# Patient Record
Sex: FEMALE | Race: WHITE | Employment: OTHER | URBAN - METROPOLITAN AREA
[De-identification: names, ages, dates, MRNs, and addresses within clinical notes are randomized per-mention and may not be internally consistent; named-entity substitution may affect disease eponyms.]

---

## 2018-03-05 ENCOUNTER — HOSPITAL ENCOUNTER (OUTPATIENT)
Age: 78
Setting detail: SPECIMEN
Discharge: HOME | End: 2018-03-05
Payer: MEDICARE

## 2018-03-08 LAB — DERMATOLOGY PATHOLOGY REPORT: NORMAL

## 2019-01-21 ENCOUNTER — HOSPITAL ENCOUNTER (EMERGENCY)
Age: 79
Discharge: HOME | End: 2019-01-21
Attending: EMERGENCY MEDICINE
Payer: MEDICARE

## 2019-01-21 ENCOUNTER — APPOINTMENT (OUTPATIENT)
Dept: GENERAL RADIOLOGY | Age: 79
End: 2019-01-21
Payer: MEDICARE

## 2019-01-21 VITALS
HEIGHT: 62 IN | TEMPERATURE: 98 F | RESPIRATION RATE: 16 BRPM | BODY MASS INDEX: 30.62 KG/M2 | DIASTOLIC BLOOD PRESSURE: 69 MMHG | WEIGHT: 166.38 LBS | OXYGEN SATURATION: 99 % | SYSTOLIC BLOOD PRESSURE: 156 MMHG | HEART RATE: 96 BPM

## 2019-01-21 DIAGNOSIS — K59.00 CONSTIPATION, UNSPECIFIED CONSTIPATION TYPE: Primary | ICD-10-CM

## 2019-01-21 PROCEDURE — 74018 RADEX ABDOMEN 1 VIEW: CPT

## 2019-01-21 PROCEDURE — 99283 EMERGENCY DEPT VISIT LOW MDM: CPT

## 2019-01-21 RX ORDER — VALSARTAN 160 MG/1
TABLET ORAL DAILY
COMMUNITY

## 2019-01-21 RX ORDER — SODIUM PHOSPHATE, DIBASIC AND SODIUM PHOSPHATE, MONOBASIC 7; 19 G/133ML; G/133ML
118 ENEMA RECTAL ONCE
Status: DISCONTINUED | OUTPATIENT
Start: 2019-01-21 | End: 2019-01-21

## 2019-01-21 RX ORDER — RANITIDINE 150 MG/1
TABLET ORAL 2 TIMES DAILY
COMMUNITY

## 2019-01-21 RX ORDER — POLYETHYLENE GLYCOL 3350 17 G/17G
17 POWDER, FOR SOLUTION ORAL DAILY PRN
Qty: 510 G | Refills: 0 | Status: SHIPPED | OUTPATIENT
Start: 2019-01-21 | End: 2019-02-20

## 2019-01-21 RX ORDER — SIMVASTATIN 10 MG
TABLET ORAL NIGHTLY
COMMUNITY

## 2019-01-21 ASSESSMENT — PAIN SCALES - GENERAL: PAINLEVEL_OUTOF10: 9

## 2019-01-21 ASSESSMENT — PAIN DESCRIPTION - PAIN TYPE: TYPE: ACUTE PAIN

## 2019-01-21 ASSESSMENT — PAIN DESCRIPTION - LOCATION: LOCATION: ABDOMEN

## 2019-05-10 ENCOUNTER — APPOINTMENT (OUTPATIENT)
Dept: CT IMAGING | Age: 79
End: 2019-05-10
Payer: COMMERCIAL

## 2019-05-10 ENCOUNTER — HOSPITAL ENCOUNTER (EMERGENCY)
Age: 79
Discharge: HOME | End: 2019-05-10
Attending: EMERGENCY MEDICINE
Payer: COMMERCIAL

## 2019-05-10 VITALS
HEART RATE: 74 BPM | HEIGHT: 62 IN | DIASTOLIC BLOOD PRESSURE: 44 MMHG | BODY MASS INDEX: 27.79 KG/M2 | OXYGEN SATURATION: 100 % | WEIGHT: 151 LBS | SYSTOLIC BLOOD PRESSURE: 134 MMHG | RESPIRATION RATE: 18 BRPM | TEMPERATURE: 98.5 F

## 2019-05-10 DIAGNOSIS — E04.1 THYROID NODULE: ICD-10-CM

## 2019-05-10 DIAGNOSIS — S20.219A CONTUSION OF CHEST WALL, UNSPECIFIED LATERALITY, INITIAL ENCOUNTER: Primary | ICD-10-CM

## 2019-05-10 LAB
ALBUMIN SERPL-MCNC: 4.2 G/DL (ref 3.5–5.2)
ALBUMIN/GLOBULIN RATIO: ABNORMAL (ref 1–2.5)
ALP BLD-CCNC: 71 U/L (ref 35–104)
ALT SERPL-CCNC: 17 U/L (ref 5–33)
ANION GAP SERPL CALCULATED.3IONS-SCNC: 12 MMOL/L (ref 9–17)
AST SERPL-CCNC: 24 U/L
BILIRUB SERPL-MCNC: 0.41 MG/DL (ref 0.3–1.2)
BUN BLDV-MCNC: 19 MG/DL (ref 8–23)
BUN/CREAT BLD: 21 (ref 9–20)
CALCIUM SERPL-MCNC: 9.7 MG/DL (ref 8.6–10.4)
CHLORIDE BLD-SCNC: 100 MMOL/L (ref 98–107)
CO2: 27 MMOL/L (ref 20–31)
CREAT SERPL-MCNC: 0.91 MG/DL (ref 0.5–0.9)
GFR AFRICAN AMERICAN: >60 ML/MIN
GFR NON-AFRICAN AMERICAN: 60 ML/MIN
GFR SERPL CREATININE-BSD FRML MDRD: ABNORMAL ML/MIN/{1.73_M2}
GFR SERPL CREATININE-BSD FRML MDRD: ABNORMAL ML/MIN/{1.73_M2}
GLUCOSE BLD-MCNC: 127 MG/DL (ref 70–99)
HCT VFR BLD CALC: 40 % (ref 36.3–47.1)
HEMOGLOBIN: 12.8 G/DL (ref 11.9–15.1)
MCH RBC QN AUTO: 29.4 PG (ref 25.2–33.5)
MCHC RBC AUTO-ENTMCNC: 32 G/DL (ref 28.4–34.8)
MCV RBC AUTO: 91.7 FL (ref 82.6–102.9)
NRBC AUTOMATED: ABNORMAL PER 100 WBC
PDW BLD-RTO: 12.5 % (ref 11.8–14.4)
PLATELET # BLD: 214 K/UL (ref 138–453)
PMV BLD AUTO: 11.2 FL (ref 8.1–13.5)
POTASSIUM SERPL-SCNC: 3.6 MMOL/L (ref 3.7–5.3)
RBC # BLD: 4.36 M/UL (ref 3.95–5.11)
SODIUM BLD-SCNC: 139 MMOL/L (ref 135–144)
TOTAL PROTEIN: 7.7 G/DL (ref 6.4–8.3)
TROPONIN INTERP: NORMAL
TROPONIN T: NORMAL NG/ML
TROPONIN, HIGH SENSITIVITY: 12 NG/L (ref 0–14)
WBC # BLD: 11.5 K/UL (ref 3.5–11.3)

## 2019-05-10 PROCEDURE — 85027 COMPLETE CBC AUTOMATED: CPT

## 2019-05-10 PROCEDURE — 2580000003 HC RX 258: Performed by: EMERGENCY MEDICINE

## 2019-05-10 PROCEDURE — 96374 THER/PROPH/DIAG INJ IV PUSH: CPT

## 2019-05-10 PROCEDURE — 81003 URINALYSIS AUTO W/O SCOPE: CPT

## 2019-05-10 PROCEDURE — 6360000002 HC RX W HCPCS: Performed by: NURSE PRACTITIONER

## 2019-05-10 PROCEDURE — 2580000003 HC RX 258: Performed by: NURSE PRACTITIONER

## 2019-05-10 PROCEDURE — 6360000004 HC RX CONTRAST MEDICATION: Performed by: EMERGENCY MEDICINE

## 2019-05-10 PROCEDURE — 74177 CT ABD & PELVIS W/CONTRAST: CPT

## 2019-05-10 PROCEDURE — 84484 ASSAY OF TROPONIN QUANT: CPT

## 2019-05-10 PROCEDURE — 96375 TX/PRO/DX INJ NEW DRUG ADDON: CPT

## 2019-05-10 PROCEDURE — 71260 CT THORAX DX C+: CPT

## 2019-05-10 PROCEDURE — 80053 COMPREHEN METABOLIC PANEL: CPT

## 2019-05-10 PROCEDURE — 99285 EMERGENCY DEPT VISIT HI MDM: CPT

## 2019-05-10 PROCEDURE — 93005 ELECTROCARDIOGRAM TRACING: CPT

## 2019-05-10 RX ORDER — SODIUM CHLORIDE 0.9 % (FLUSH) 0.9 %
10 SYRINGE (ML) INJECTION PRN
Status: DISCONTINUED | OUTPATIENT
Start: 2019-05-10 | End: 2019-05-10 | Stop reason: HOSPADM

## 2019-05-10 RX ORDER — IBUPROFEN 600 MG/1
600 TABLET ORAL EVERY 6 HOURS PRN
Qty: 40 TABLET | Refills: 0 | Status: SHIPPED | OUTPATIENT
Start: 2019-05-10

## 2019-05-10 RX ORDER — 0.9 % SODIUM CHLORIDE 0.9 %
80 INTRAVENOUS SOLUTION INTRAVENOUS ONCE
Status: COMPLETED | OUTPATIENT
Start: 2019-05-10 | End: 2019-05-10

## 2019-05-10 RX ORDER — ACETAMINOPHEN AND CODEINE PHOSPHATE 300; 30 MG/1; MG/1
1 TABLET ORAL EVERY 4 HOURS PRN
Qty: 18 TABLET | Refills: 0 | Status: SHIPPED | OUTPATIENT
Start: 2019-05-10 | End: 2019-05-13

## 2019-05-10 RX ORDER — ONDANSETRON 2 MG/ML
4 INJECTION INTRAMUSCULAR; INTRAVENOUS ONCE
Status: COMPLETED | OUTPATIENT
Start: 2019-05-10 | End: 2019-05-10

## 2019-05-10 RX ORDER — MORPHINE SULFATE 2 MG/ML
2 INJECTION, SOLUTION INTRAMUSCULAR; INTRAVENOUS ONCE
Status: COMPLETED | OUTPATIENT
Start: 2019-05-10 | End: 2019-05-10

## 2019-05-10 RX ORDER — 0.9 % SODIUM CHLORIDE 0.9 %
250 INTRAVENOUS SOLUTION INTRAVENOUS ONCE
Status: COMPLETED | OUTPATIENT
Start: 2019-05-10 | End: 2019-05-10

## 2019-05-10 RX ADMIN — SODIUM CHLORIDE 250 ML: 0.9 INJECTION, SOLUTION INTRAVENOUS at 15:00

## 2019-05-10 RX ADMIN — IOPAMIDOL 75 ML: 755 INJECTION, SOLUTION INTRAVENOUS at 15:12

## 2019-05-10 RX ADMIN — ONDANSETRON 4 MG: 2 INJECTION INTRAMUSCULAR; INTRAVENOUS at 14:23

## 2019-05-10 RX ADMIN — SODIUM CHLORIDE 80 ML: 9 INJECTION, SOLUTION INTRAVENOUS at 15:12

## 2019-05-10 RX ADMIN — Medication 10 ML: at 15:12

## 2019-05-10 RX ADMIN — MORPHINE SULFATE 2 MG: 2 INJECTION, SOLUTION INTRAMUSCULAR; INTRAVENOUS at 14:23

## 2019-05-10 ASSESSMENT — PAIN DESCRIPTION - DESCRIPTORS: DESCRIPTORS: TENDER

## 2019-05-10 ASSESSMENT — PAIN SCALES - GENERAL
PAINLEVEL_OUTOF10: 6
PAINLEVEL_OUTOF10: 8

## 2019-05-10 ASSESSMENT — PAIN DESCRIPTION - ONSET: ONSET: SUDDEN

## 2019-05-10 ASSESSMENT — PAIN DESCRIPTION - ORIENTATION: ORIENTATION: ANTERIOR;LOWER

## 2019-05-10 ASSESSMENT — ENCOUNTER SYMPTOMS
SINUS PAIN: 0
BACK PAIN: 0
CHEST TIGHTNESS: 0
ABDOMINAL PAIN: 0
COUGH: 0
EYE PAIN: 0
VOMITING: 0
EYE REDNESS: 0
NAUSEA: 0
SHORTNESS OF BREATH: 0

## 2019-05-10 ASSESSMENT — PAIN DESCRIPTION - LOCATION: LOCATION: CHEST;RIB CAGE
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
## 2019-05-11 LAB
EKG ATRIAL RATE: 75 BPM
EKG P AXIS: 43 DEGREES
EKG P-R INTERVAL: 182 MS
EKG Q-T INTERVAL: 410 MS
EKG QRS DURATION: 84 MS
EKG QTC CALCULATION (BAZETT): 457 MS
EKG R AXIS: 8 DEGREES
EKG T AXIS: 20 DEGREES
EKG VENTRICULAR RATE: 75 BPM

## 2021-02-04 ENCOUNTER — HOSPITAL ENCOUNTER (OUTPATIENT)
Dept: PHYSICAL THERAPY | Facility: CLINIC | Age: 81
Setting detail: THERAPIES SERIES
Discharge: HOME | End: 2021-02-04
Payer: MEDICARE

## 2021-02-04 PROCEDURE — 97110 THERAPEUTIC EXERCISES: CPT

## 2021-02-04 PROCEDURE — 97161 PT EVAL LOW COMPLEX 20 MIN: CPT

## 2021-02-04 NOTE — CONSULTS
[] Pacemaker  [] HTN   [] Cancer   [x] Arthritis:   [] Surgeries: none to cervical or lumbar spines thus far. [x] Other:    Test: [] X-Ray  [x] MRI- nothing significant. Allergies: [x] NKA  [] Refer to intake sheet  Medication: [x] Refer to intake sheet  [] None  Denies utilizing Meclizine or anti-nausea medication. Function:     Hand dominance [x] (R)  [] (L)  Working:     [] Normal duty  [] Light duty  [] Off D/T Condition   [x] Retired  [] Not employed [] Disability   [] Other   Return to work:     Pain:    /10  [x] No c/o pain    Pain altered Tx: [x] No  [] Yes Action:  Symptoms: [] Improving  [] Worsening  [x] Same- \"sporadic. \"   Sleep: [x] OK  [] Disturbed       Function:  Hand Dominance  [x] Right  [] Left  Patient lives with: -   In what type of home [x]  One story   [] Two story   [] Split level   Number of stairs to enter -   Employer -   Job Status []  Normal duty   [] Light duty   [] Off due to condition    [x]  Retired   [] Not employed   [] Disability  [] Other:  []  Return to work:    Work activities/duties -     Gait Prior level of function Current level of function    [x] Independent  [] Assist [x] Independent  [] Assist   Device: [] Independent [] Independent    [] Straight Cane [] Quad cane [] Straight Cane [] Quad cane    [] Standard walker [] Rolling walker   [] 4 wheeled walker [] Standard walker [] Rolling walker   [] 4 wheeled walker    [] Wheelchair [] Wheelchair     Subjective Measure Score Indications   DHI [Dizziness Handicap Inventory] X; 16/100 LOW IMPAIRMENT/DISABILITY   ABC [Activities Balance Confidence Scale] X LOW IMPAIRMENT/DISABILITY   PCSS [Post Concussion Symptom Survey Scale] - -   BIVSS [Brain Injury Vision Symptom Survey]  - -     OBSERVATION No Deficit Deficit Not Tested   Posture      Forward Head []  [x]  []    Rounded Shoulders [x] [] []   Kyphosis [x] []inc.  [] dec. []   Lordosis [x] []inc.  [] dec. []   Lateral Shift [x] [] (R) [] (L) [] Scoliosis [x] [] []     Denies CVA/TIA, neuropathy history within herself. Red flags & Special Tests:     Test Result   Vertebral Artery N   Alar Ligament N   Transverse Ligament N     Cervical spine AROM:    Motion Goniometric Measurement Pain/Symptoms   Flexion WNL NONE   Extension WNL NONE   R, L rotation 75% R; 90% L \"Fuzzy\" R   R, L SB 25% B NONE     Cervical spine strength: Motion MMT Grade   Flexion WNL   Extension WNL   R, L rotation WNL   R, L SB WNL     Oculomotor Tests- With Fixation ([facility]):    Dons glasses at all times. Test Result- Normal, Abnormal (R) (L) Upward Downward Description   Spontaneous Nystagmus N        Gaze Holding Nystagmus N        Smooth Pursuit N        Ocular ROM N        Saccades N        Convergence    N/A N/A -   VOR Slow N N/A N/A N/A N/A    VOR Cancellation N N/A N/A N/A N/A    Head Thrust N   N/A N/A    Static Visual Acuity N N/A N/A N/A N/A -   Dynamic Visual Acuity WNL    >3 Line difference    Muscle Guard N/A N/A N/A N/A -   Valsalva N          Oculomotor Tests- Without Fixation (Vision Blocked):    Test Result Description   Spontaneous Nystagmus N    Gaze Hold Nystagmus N    Valsalva N    Head-Shaking Nystagmus N      Positional Tests:    Test R Torsional Upbeat L Torsional Upbeat Up Beat Down Beat Horizontal Duration Symptoms   Andrew Hallpike- R - - - - - - -   Andrew Hallpike- L - - - - - - -   Roll Test - - - - - - -     Also performed roll test with pt physically rolling to either side. Pt negative for nystagmus and symptoms with BPPV screens of all canals. Positional Treatments: NOT APPROPRIATE FOR TODAY     BPPV Type Treatment Technique Trials Result Other:                                 Problems:                                                                                     [x]  1. Vertigo  []  2. Difficulty walking a straight course                                    []  3.   Positive Jesi Hogan []  4.  Static balance deficit: mCTSIB  []  5. Dynamic balance deficit: Higgins Balance Scale (BBS), Dynamic Gait Index (DGI), Functional Gait Assessment (FGA)  [x]  6. Increased Dizziness Handicap Inventory ([facility])   []  7. Increased Post Concussion Symptom Survey (PCSS)  []  8. Increased Brain Injury Vision Symptom Survey (BIVSS)      Short Term Goals: (    6         Treatments)  [x] 1. Decreased Vertigo  [] 2. Improved gait mechanics, trajectory  [] 3. Negative Andrew Hallpike  [] 4. Improved static balance  [] 5. Improved dynamic balance  [x] 6. Decreased Dizziness Handicap Inventory ([facility])- <16/100  [] 7. Decreased Post Concussion Symptom Survey (PCSS)  [] 8. Decreased Brain Injury Vision Symptom Survey (BIVSS)  [x] 9. Independent with Home Exercise Program (HEP)  [] 10. Demonstrate knowledge of fall prevention   11. Will deny symptoms with rolling to the L in bed and laying supine in bed with turning head to the L in order to improve bed mobility and QOL. Patient Goals: none listed. Assessment: Patient is a 79 y.o. pleasant female who presents to physical therapy initial evaluation with signs and symptoms consistent with potential chronic history of positional vertigo- potential L posterior canal VS horizontal canal- now resolved. Patient demonstrates impairments and symptoms as detailed below in therapy goals. Patient intermittently limited in rolling to L in bed and laying supine in bed with turning her head to the L secondary to these impairments and increased symptoms. Patient may benefit from continued physical therapy services in order to address these impairments and symptoms, and return to QOL, ADLs, work- if symptoms return or intensify. Anticipated frequency detailed above. Prognosis not limited d/t secondary factors- justifying a low complexity evaluation. If unable to achieve significant improvements within six visits, will consult referring physician and consider a follow-up visit with said physician. Patient verbalized understanding and agreement to all aforementioned statements.      Rehab Potential: [x] Good  [] Fair  [] Poor  Suggested Professional Referral:  [x] No  [] Yes  Barriers to Goal Achievement: [x] No  [] Yes        If yes:  Domestic Concerns: [x] No  [] Yes     If yes:    Treatment Plan:  [x] Therapeutic Exercise   80456  [] Iontophoresis: 4 mg/mL Dexamethasone Sodium Phosphate  mAmin  41721   [x] Therapeutic Activity  49373 [] Vasopneumatic cold with compression  99363    [] Gait Training   29093 [] Ultrasound   04916   [] Neuromuscular Re-education  85617 [] Electrical Stimulation Unattended  17706   [] Manual Therapy  68798 [] Electrical Stimulation Attended  78774   [x] Instruction in HEP  [] Lumbar/Cervical Traction  52528   [] Aquatic Therapy   62922 [] Cold/hotpack    [] Massage   99262      [] Dry Needling, 1 or 2 muscles  21786   [] Biofeedback, first 15 minutes   23702  [] Biofeedback, additional 15 minutes   03476 [] Dry Needling, 3 or more muscles 65951     []  Medication allergies reviewed for use of    Dexamethasone Sodium Phosphate 4mg/ml     with iontophoresis treatments. Pt is not allergic. Other: X- CRT- [id number]. Frequency: 1 X/wk  x 6 visits [potential if returns]      [x] Plans/Goals, Risk/Benefits discussed with pt/family      Pt/Family Education: [x] Verbal  [x] Demo  [x] Written    Comprehension of Education:  [x] Verbalizes understanding. [] Demonstrates understanding. [x] Needs Review. [] Demonstrates/verbalizes understanding of HEP/Ed previously given. Todays Treatment:  Modalities:   Precautions: chronic history of positional vertigo- potential L posterior canal VS horizontal canal- now resolved   Exercises:  Exercise Reps/ Time Weight/ Level Comments   Education     Vestibular evaluation- objective tests and measures- rationale, expectations, results; chronic positional vertigo- potential L posterior canal VS horizontal canal- now resolved- issued informational handouts; not appropriate for treatment today; instructed to cease barker-daroff at home; chart will remain open for x1 month- if does not return in this time, chart will be discharged- verbalized understanding to all                                           Other:     Specific Instructions for next treatment: Re-assess for BPPV within all canals and treat if appropriate next visit. Discharge chart if does not return within x1 month.      Evaluation Complexity:  History (Personal factors, comorbidities) [] 0 [x] 1-2 [] 3+   Exam (limitations, restrictions) [x] 1-2 [] 3 [] 4+   Clinical presentation (progression) [x] Stable [] Evolving  [] Unstable   Decision Making [x] Low [] Moderate [] High     [x] Low Complexity [] Moderate Complexity [] High Complexity     Today's Treatment Charges        Mins       Units   [x] PT Evaluation- [x] Low; [] Moderate; [] High  47 1   [] Canalith repositioning maneuver/technique (CRM/T)     [x] Therapeutic Exercise 15min 10 1 TOTAL TREATMENT TIME: 62    Medicare- $106.04    Start Time: 1:08PM             Stop Time: 2:05PM     More objective information is available upon request.  Thank you for this referral.    Electronically signed by: Nora Lindo PT, DPT    Physician Signature:________________________________Date:__________________  By signing above or cosigning this note, I have reviewed this plan of care and certify a need for medically necessary rehabilitation services.       *PLEASE SIGN ABOVE AND FAX BACK ALL PAGES*

## 2021-02-04 NOTE — FLOWSHEET NOTE
John Fall Risk Assessment    Patient Name:  Bruna Steinberg  : 1940    Risk Factor Scale  Score   History of Falls [] Yes  [x] No 25  0 0   Secondary Diagnosis [] Yes  [x] No 15  0 0   Ambulatory Aid [] Furniture  [] Crutches/cane/walker  [x] None/bedrest/wheelchair/nurse 30  15  0 0   IV/Heparin Lock [] Yes  [x] No 20  0 0   Gait/Transferring [] Impaired  [x] Weak  [] Normal/bedrest/immobile 20  10  0 10   Mental Status [] Forgets limitations  [x] Oriented to own ability 15  0 0      Total: 10     Based on the Assessment score: check the appropriate box.     [x]  No intervention needed   Low =   Score of 0-24    []  Use standard prevention interventions Moderate =  Score of 24-44   [] Give patient handout and discuss fall prevention strategies   [] Establish goal of education for patient/family RE: fall prevention strategies    []  Use high risk prevention interventions High = Score of 45 and higher   [] Give patient handout and discuss fall prevention strategies   [] Establish goal of education for patient/family Re: fall prevention strategies   [] Discuss lifeline / other resources    Electronically signed by:   Mark Cuello PT  Date: 2021

## 2021-02-04 NOTE — CARE COORDINATION
Left message with answering service for Dr Bud Sierra office requesting a diagnosis code for vestibular rehab referral. Requested for physician's office to fax over an updated referral with dx code.   Electronically signed by Monserrat Holguin on 2/4/2021 at 1:05 PM

## 2021-02-05 NOTE — FLOWSHEET NOTE
John Fall Risk Assessment    Patient Name:  Nick Diego  : 1940    Risk Factor Scale  Score   History of Falls [] Yes  [x] No 25  0 0   Secondary Diagnosis [] Yes  [x] No 15  0 0   Ambulatory Aid [] Furniture  [] Crutches/cane/walker  [x] None/bedrest/wheelchair/nurse 30  15  0 0   IV/Heparin Lock [] Yes  [x] No 20  0 0   Gait/Transferring [] Impaired  [] Weak  [x] Normal/bedrest/immobile 20  10  0 0   Mental Status [] Forgets limitations  [x] Oriented to own ability 15  0 0      Total: 0     Based on the Assessment score: check the appropriate box.     [x]  No intervention needed   Low =   Score of 0-24    []  Use standard prevention interventions Moderate =  Score of 24-44   [] Give patient handout and discuss fall prevention strategies   [] Establish goal of education for patient/family RE: fall prevention strategies    []  Use high risk prevention interventions High = Score of 45 and higher   [] Give patient handout and discuss fall prevention strategies   [] Establish goal of education for patient/family Re: fall prevention strategies   [] Discuss lifeline / other resources    Electronically signed by:   Jose Elias Krueger PT  Date: 2021

## 2021-02-23 NOTE — DISCHARGE SUMMARY
[] [facility]) - [facility] &  Therapy  [address]  [phone]  F: [phone] [] [address]  [place] [address]   [address]  P: [phone]  F: [phone] [] [address] &  Therapy  [address]  P: [phone]  F: [phone] [x] [address]  P: [phone]  F: [phone] [] [address]  [facility]   [address]   [place]: [phone]  F: [phone]      Physical Therapy Discharge Note    Date: 2021      Patient: Cathi Florian  :   MRN: [id number]    Physician: Ad Lawrence MD              Insurance: Medicare   Medical Diagnosis: R42- dizziness              Rehab Codes: R42  Onset date: 2010; 2021- most recent episode                     Next Dr's appt. : x3 months with PCP  Date of initial visit: 2021                Date of final visit: 2021    Assessment:  Pt discharged today from PT services secondary to not returning- most likely due to no symptoms at initial evaluation. Unable to formally re-assess all therapy goals or outcomes measure. No further PT services recommended at this time. Problems:                                                                                     [x]? 1.  Vertigo  []? 2.  Difficulty walking a straight course                                    []?  3.  Positive Andrew Hallpike                                               []?  4.  Static balance deficit: mCTSIB  []? 5.  Dynamic balance deficit: Higgins Balance Scale (BBS), Dynamic Gait Index (DGI), Functional Gait Assessment (FGA)  [x]? 6. Increased Dizziness Handicap Inventory [facility])   []? 7. Increased Post Concussion Symptom Survey (PCSS)  []? 8.   Increased Brain Injury Vision Symptom Survey (BIVSS)       Short Term Goals: (    6         Treatments)  [x]? 1. Decreased Vertigo  []? 2. Improved gait mechanics, trajectory  []? 3. Negative Tabitha Axel  []? 4. Improved static balance  []? 5. Improved dynamic balance  [x]? 6. Decreased Dizziness Handicap Inventory ([facility])- <16/100  []? 7. Decreased Post Concussion Symptom Survey (PCSS)  []? 8. Decreased Brain Injury Vision Symptom Survey (BIVSS)  [x]? 9. Independent with Home Exercise Program (HEP)  []? 10. Demonstrate knowledge of fall prevention   11. Will deny symptoms with rolling to the L in bed and laying supine in bed with turning head to the L in order to improve bed mobility and QOL.     Patient Goals: none listed. Treatment to Date:  [x] Therapeutic Exercise    [] Modalities:  [] Therapeutic Activity    [] Ultrasound  [] Electrical Stimulation  [] Gait Training     [] Massage       [] Lumbar/Cervical Traction  [] Neuromuscular Re-education [] Cold/hotpack [] Iontophoresis: 4 mg/mL  [] Instruction in Home Exercise Program                     Dexamethasone Sodium  [] Manual Therapy             Phosphate 40-80 mAmin  [] Aquatic Therapy                   [] Vasocompression/    [x] Other: vestibular evaluation. Game Ready    Discharge Status:     [] Pt recovered from conditions. Treatment goals were met. [] Pt received maximum benefit. No further therapy indicated at this time. [] Pt to continue exercise/home instructions independently. [] Therapy interrupted due to:    [] Pt has 2 or more no shows/cancels, is discontinued per our policy. [] Pt has completed prescribed number of treatment sessions. [x] Other: see above. Electronically signed by Shasha Franks PT on 2/23/2021 at 11:43 AM    If you have any questions or concerns, please don't hesitate to call.   Thank you for your referral.

## 2021-05-18 ENCOUNTER — HOSPITAL ENCOUNTER (EMERGENCY)
Facility: CLINIC | Age: 81
Discharge: HOME | End: 2021-05-18
Attending: EMERGENCY MEDICINE
Payer: MEDICARE

## 2021-05-18 VITALS
HEART RATE: 82 BPM | HEIGHT: 62 IN | RESPIRATION RATE: 16 BRPM | TEMPERATURE: 98.5 F | SYSTOLIC BLOOD PRESSURE: 149 MMHG | WEIGHT: 150 LBS | OXYGEN SATURATION: 98 % | DIASTOLIC BLOOD PRESSURE: 69 MMHG | BODY MASS INDEX: 27.6 KG/M2

## 2021-05-18 DIAGNOSIS — S61.215A LACERATION OF LEFT RING FINGER WITHOUT FOREIGN BODY WITHOUT DAMAGE TO NAIL, INITIAL ENCOUNTER: ICD-10-CM

## 2021-05-18 DIAGNOSIS — S61.213A LACERATION OF LEFT MIDDLE FINGER WITHOUT FOREIGN BODY WITHOUT DAMAGE TO NAIL, INITIAL ENCOUNTER: Primary | ICD-10-CM

## 2021-05-18 PROCEDURE — 2500000003 HC RX 250 WO HCPCS: Performed by: EMERGENCY MEDICINE

## 2021-05-18 PROCEDURE — 90471 IMMUNIZATION ADMIN: CPT | Performed by: EMERGENCY MEDICINE

## 2021-05-18 PROCEDURE — 6360000002 HC RX W HCPCS: Performed by: EMERGENCY MEDICINE

## 2021-05-18 PROCEDURE — 90715 TDAP VACCINE 7 YRS/> IM: CPT | Performed by: EMERGENCY MEDICINE

## 2021-05-18 PROCEDURE — 12001 RPR S/N/AX/GEN/TRNK 2.5CM/<: CPT

## 2021-05-18 PROCEDURE — 99284 EMERGENCY DEPT VISIT MOD MDM: CPT

## 2021-05-18 RX ORDER — LIDOCAINE HYDROCHLORIDE 10 MG/ML
20 INJECTION, SOLUTION INFILTRATION; PERINEURAL ONCE
Status: COMPLETED | OUTPATIENT
Start: 2021-05-18 | End: 2021-05-18

## 2021-05-18 RX ADMIN — TETANUS TOXOID, REDUCED DIPHTHERIA TOXOID AND ACELLULAR PERTUSSIS VACCINE, ADSORBED 0.5 ML: 5; 2.5; 8; 8; 2.5 SUSPENSION INTRAMUSCULAR at 19:33

## 2021-05-18 RX ADMIN — LIDOCAINE HYDROCHLORIDE 20 ML: 10 INJECTION, SOLUTION INFILTRATION; PERINEURAL at 19:35

## 2021-05-18 NOTE — ED NOTES
Dr. Vi Munguia at bedside to place 5 sutures to right middle finger.      Man Christina RN  05/18/21 [address], RN  05/18/21 1017

## 2021-05-19 NOTE — ED PROVIDER NOTES
eMERGENCY dEPARTMENT eNCOUnter      Pt Name: Eugene Jimenez  MRN: [id number]  [place] 1940  Date of evaluation: 5/18/2021      CHIEF COMPLAINT       Chief Complaint   Patient presents with    Laceration     Hand left. Middle finger. HISTORY OF PRESENT ILLNESS    Eugene Jimenez is a 79 y.o. female who presents laceration. Patient states about 130 today she was using hedge tremors and cut her left middle and ring finger to continue just a rapid try to get it to stop but she is here for evaluation uncertain when her last tetanus was she denies any numbness tingling or weakness        REVIEW OF SYSTEMS       Positive for laceration negative for numbness tingling or weakness    PAST MEDICAL HISTORY    has a past medical history of Hyperlipidemia and Hypertension. SURGICAL HISTORY      has a past surgical history that includes Hysterectomy. CURRENT MEDICATIONS       Discharge Medication List as of 5/18/2021  7:48 PM      CONTINUE these medications which have NOT CHANGED    Details   ibuprofen (ADVIL;MOTRIN) 600 MG tablet Take 1 tablet by mouth every 6 hours as needed for Pain, Disp-40 tablet, R-0Print      valsartan (DIOVAN) 160 MG tablet Take by mouth dailyHistorical Med      simvastatin (ZOCOR) 10 MG tablet Take by mouth nightlyHistorical Med      ranitidine (ZANTAC) 150 MG tablet Take by mouth 2 times dailyHistorical Med             ALLERGIES     has No Known Allergies. FAMILY HISTORY     has no family status information on file. family history is not on file. SOCIAL HISTORY      reports that she has quit smoking. She has never used smokeless tobacco. She reports current alcohol use. She reports that she does not use drugs. PHYSICAL EXAM     INITIAL VITALS:  height is 5' 2\" (1.575 m) and weight is 68 kg (150 lb). Her oral temperature is 98.5 °F (36.9 °C). Her blood pressure is 149/69 (abnormal) and her pulse is 82. Her respiration is 16 and oxygen saturation is 98%.       General: foreign bodies closed using tissue adhesive    FINAL IMPRESSION      1. Laceration of left middle finger without foreign body without damage to nail, initial encounter    2. Laceration of left ring finger without foreign body without damage to nail, initial encounter          DISPOSITION/PLAN   DISPOSITION Decision To Discharge 05/18/2021 07:47:33 PM      Condition on Disposition    Stable    PATIENT REFERRED TO:  Meagan Hesterindigo, [address] [facility] [address]  [phone]      Stitches out 10 days      DISCHARGE MEDICATIONS:  Discharge Medication List as of 5/18/2021  7:48 PM          (Please note that portions of this note were completed with a voice recognition program.  Efforts were made to edit the dictations but occasionally words are mis-transcribed.)    Charlene Aguilera MD,, MD, F.A.C.E.P.   Attending Emergency Physician        Charlene Aguilera MD  05/18/21 3976

## 2021-05-28 ENCOUNTER — HOSPITAL ENCOUNTER (EMERGENCY)
Facility: CLINIC | Age: 81
Discharge: HOME | End: 2021-05-28
Attending: EMERGENCY MEDICINE
Payer: MEDICARE

## 2021-05-28 VITALS
HEART RATE: 70 BPM | TEMPERATURE: 97.5 F | SYSTOLIC BLOOD PRESSURE: 139 MMHG | HEIGHT: 62 IN | DIASTOLIC BLOOD PRESSURE: 64 MMHG | BODY MASS INDEX: 27.6 KG/M2 | WEIGHT: 150 LBS | RESPIRATION RATE: 18 BRPM | OXYGEN SATURATION: 96 %

## 2021-05-28 DIAGNOSIS — Z48.02 VISIT FOR SUTURE REMOVAL: Primary | ICD-10-CM

## 2021-05-28 PROCEDURE — 99284 EMERGENCY DEPT VISIT MOD MDM: CPT

## 2021-05-28 NOTE — ED PROVIDER NOTES
[facility] ED  [address] [facility]  Phone: [phone]      Pt Name: La Ritchie  MNW:[id number]  [place] 1940  Date of evaluation: 5/28/2021      CHIEF COMPLAINT       Chief Complaint   Patient presents with    Suture / Staple Removal     happened 10 days ago, left middle finger       HISTORY OF PRESENT ILLNESS   71-year-old female presents to the emergency department today for suture removal.  10 days ago she accidentally cut herself using trimming kylah. She lacerated the tip of her left third digit. Sutures have been in place since then. They have been healing nicely and she does not have any concerns. REVIEWOF SYSTEMS     Review of Systems   All other systems reviewed and are negative. PAST MEDICAL HISTORY    has a past medical history of Hyperlipidemia and Hypertension. SURGICAL HISTORY      has a past surgical history that includes Hysterectomy. [address]       Current Discharge Medication List      CONTINUE these medications which have NOT CHANGED    Details   ibuprofen (ADVIL;MOTRIN) 600 MG tablet Take 1 tablet by mouth every 6 hours as needed for Pain  Qty: 40 tablet, Refills: 0      valsartan (DIOVAN) 160 MG tablet Take by mouth daily      simvastatin (ZOCOR) 10 MG tablet Take by mouth nightly      ranitidine (ZANTAC) 150 MG tablet Take by mouth 2 times daily             ALLERGIES     has No Known Allergies. FAMILY HISTORY     has no family status information on file. family history is not on file. SOCIAL HISTORY      reports that she has quit smoking. She has never used smokeless tobacco. She reports current alcohol use. She reports that she does not use drugs. PHYSICAL EXAM     INITIAL VITALS:  height is 5' 2\" (1.575 m) and weight is 68 kg (150 lb). Her oral temperature is 97.5 °F (36.4 °C). Her blood pressure is 139/64 and her pulse is 70. Her respiration is 18 and oxygen saturation is 96%. Physical Exam  Vitals reviewed. Constitutional:       General: She is not in acute distress. Appearance: She is well-developed. HENT:      Head: Normocephalic and atraumatic. Eyes:      Conjunctiva/sclera: Conjunctivae normal.      Pupils: Pupils are equal, round, and reactive to light. Neck:      Trachea: No tracheal deviation. Pulmonary:      Effort: No respiratory distress. Abdominal:      Palpations: Abdomen is soft. Musculoskeletal:         General: No tenderness. Normal range of motion. Skin:     General: Skin is warm and dry. Comments: Sutures are in place in this patient's left third digit. No signs of infection. Wound is healing nicely. Neurological:      Mental Status: She is alert and oriented to person, place, and time. Psychiatric:         Behavior: Behavior normal.         Thought Content: Thought content normal.         Judgment: Judgment normal.           MDM:   Sutures have been removed by me. Patient tolerated procedure well. She does not have any further questions concerns or complaints. The patient was given the following medications:  No orders of the defined types were placed in this encounter. FINAL IMPRESSION      1.  Visit for suture removal          DISPOSITION/PLAN   DISPOSITION Decision To Discharge 05/28/2021 09:13:54 AM      Condition on Disposition  Good    PATIENT REFERRED TO:  Chastityeleonora Bonds, [address] [facility] [address] [phone]    Schedule an appointment as soon as possible for a visit in 3 days  For wound re-check      DISCHARGE MEDICATIONS:  Current Discharge Medication List          (Please note that portions of this note werecompleted with a voice recognition program.  Efforts were made to edit the dictations but occasionally words are mis-transcribed.)    Lola Hashimoto, MD MD, F.A.C.E.P, F.A.A.E.M  Emergency Physician Attending          Lola Hashimoto, MD  05/28/21 3635

## 2022-08-10 ENCOUNTER — APPOINTMENT (OUTPATIENT)
Dept: GENERAL RADIOLOGY | Facility: CLINIC | Age: 82
End: 2022-08-10
Payer: MEDICARE

## 2022-08-10 ENCOUNTER — HOSPITAL ENCOUNTER (EMERGENCY)
Facility: CLINIC | Age: 82
Discharge: HOME | End: 2022-08-10
Attending: EMERGENCY MEDICINE
Payer: MEDICARE

## 2022-08-10 VITALS
HEIGHT: 62 IN | TEMPERATURE: 98.6 F | RESPIRATION RATE: 16 BRPM | OXYGEN SATURATION: 99 % | DIASTOLIC BLOOD PRESSURE: 60 MMHG | HEART RATE: 82 BPM | BODY MASS INDEX: 26.87 KG/M2 | WEIGHT: 146 LBS | SYSTOLIC BLOOD PRESSURE: 139 MMHG

## 2022-08-10 DIAGNOSIS — L03.031 CELLULITIS OF TOE OF RIGHT FOOT: Primary | ICD-10-CM

## 2022-08-10 PROCEDURE — 99283 EMERGENCY DEPT VISIT LOW MDM: CPT

## 2022-08-10 PROCEDURE — 73630 X-RAY EXAM OF FOOT: CPT

## 2022-08-10 RX ORDER — CEPHALEXIN 500 MG/1
500 CAPSULE ORAL 4 TIMES DAILY
Qty: 28 CAPSULE | Refills: 0 | Status: SHIPPED | OUTPATIENT
Start: 2022-08-10 | End: 2022-08-10

## 2022-08-10 RX ORDER — CEPHALEXIN 500 MG/1
500 CAPSULE ORAL 4 TIMES DAILY
Qty: 28 CAPSULE | Refills: 0 | Status: SHIPPED | OUTPATIENT
Start: 2022-08-10 | End: 2022-08-17

## 2022-08-10 ASSESSMENT — ENCOUNTER SYMPTOMS
GASTROINTESTINAL NEGATIVE: 1
EYES NEGATIVE: 1
RESPIRATORY NEGATIVE: 1

## 2022-08-10 ASSESSMENT — PAIN SCALES - GENERAL: PAINLEVEL_OUTOF10: 1

## 2022-08-10 ASSESSMENT — PAIN - FUNCTIONAL ASSESSMENT: PAIN_FUNCTIONAL_ASSESSMENT: 0-10

## 2022-08-10 ASSESSMENT — LIFESTYLE VARIABLES
HOW MANY STANDARD DRINKS CONTAINING ALCOHOL DO YOU HAVE ON A TYPICAL DAY: 1 OR 2
HOW OFTEN DO YOU HAVE A DRINK CONTAINING ALCOHOL: MONTHLY OR LESS

## 2022-08-10 NOTE — ED PROVIDER NOTES
[address] [facility] ED  eMERGENCY dEPARTMENT eNCOUnter   Independent Attestation     Pt Name: Donald Estrella  MRN: [id number]  [place] 1940  Date of evaluation: 8/10/22       Donald Estrella is a [de-identified] y.o. female who presents with Toe Pain        Based on the medical record, the care appears appropriate. I was personally available for consultation in the Emergency Department.     Mirna Barrera DO  Attending Emergency  Physician               Mirna Barrera DO  08/10/22 1212

## 2022-08-10 NOTE — ED PROVIDER NOTES
[facility]  [address] [facility]  Phone: [phone]        Pt Name: Maddy Hawk  MRN: [id number]  [place] 1940  Date of evaluation: 8/10/22    [address] [place] [address]       Chief Complaint   Patient presents with    Toe Pain       HISTORY OF PRESENT ILLNESS (Location/Symptom, Timing/Onset, Context/Setting, Quality, Duration, Modifying Factors, Severity)      Maddy Hawk is a [de-identified] y.o. female with no pertinent PMH who presents to the ED via private auto with reports of right little toe pain which started yesterday. Patient states that she noticed yesterday that her little toe was starting to give her a little bit of pain, she noticed a little bit of swelling and redness as well. She states she cannot remember injuring her toe, but she may have stubbed it. She denies any new shoes. Denies any history of diabetes. Denies stepping on anything that she knows of. Denies any fever chills or body aches. Denies any nausea vomiting or diarrhea. Denies any change in her urinary or bowel habits. Denies any change in her dietary habits. Denies any chest pain or shortness of breath. She states she does have a history of cellulitis on the face, which was about 20 years ago. She rates her pain as a 1 out of 10 and denies need for any pain medication at this time. She states she did take a dose of Tylenol last night which did improve her symptoms a bit. PAST MEDICAL / SURGICAL / SOCIAL / FAMILY HISTORY     PMH:  has a past medical history of Hyperlipidemia and Hypertension. Surgical History:  has a past surgical history that includes Hysterectomy. Social History:  reports that she has quit smoking. She has never used smokeless tobacco. She reports current alcohol use. She reports that she does not use drugs. Family History: has no family status information on file. family history is not on file.   Psychiatric History: None    Allergies: Percocet [oxycodone-acetaminophen]    Home Medications:   Prior to Admission medications    Medication Sig Start Date End Date Taking? Authorizing Provider   cephALEXin (KEFLEX) 500 MG capsule Take 1 capsule by mouth in the morning and 1 capsule at noon and 1 capsule in the evening and 1 capsule before bedtime. Do all this for 7 days. 8/10/22 8/17/22 Yes MARLENY Cadena - NP   ibuprofen (ADVIL;MOTRIN) 600 MG tablet Take 1 tablet by mouth every 6 hours as needed for Pain 5/10/19   MARLENY Matias - CNP   valsartan (DIOVAN) 160 MG tablet Take by mouth daily    Historical Provider, MD   simvastatin (ZOCOR) 10 MG tablet Take by mouth nightly    Historical Provider, MD   raNITIdine (ZANTAC) 150 MG tablet Take by mouth 2 times daily  Patient not taking: Reported on 8/10/2022    Historical Provider, MD       REVIEW OF SYSTEMS  (2-9 systems for level 4, 10 ormore for level 5)      Review of Systems   Constitutional: Negative. HENT: Negative. Eyes: Negative. Respiratory: Negative. Cardiovascular: Negative. Gastrointestinal: Negative. Endocrine: Negative. Genitourinary: Negative. Musculoskeletal:         Right little toe pain and swelling   Skin:         Right little toe redness   Neurological: Negative. Hematological: Negative. Psychiatric/Behavioral: Negative. All other systems negative except as marked. PHYSICAL EXAM  (up to 7 for level 4, 8 or more for level 5)      INITIAL VITALS:  height is 5' 2\" (1.575 m) and weight is 66.2 kg (146 lb). Her temperature is 98.6 °F (37 °C). Her blood pressure is 139/60 and her pulse is 82. Her respiration is 16 and oxygen saturation is 99%. Vital signs reviewed. Physical Exam  Constitutional:       Appearance: Normal appearance. HENT:      Head: Normocephalic.       Right Ear: Tympanic membrane, ear canal and external ear normal.      Left Ear: Tympanic membrane, ear canal and external ear normal.      Nose: Nose normal.      Mouth/Throat:      Mouth: Mucous membranes are moist.      Pharynx: Oropharynx is clear. Eyes:      Extraocular Movements: Extraocular movements intact. Conjunctiva/sclera: Conjunctivae normal.      Pupils: Pupils are equal, round, and reactive to light. Cardiovascular:      Rate and Rhythm: Normal rate and regular rhythm. Pulses: Normal pulses. Heart sounds: Normal heart sounds. Pulmonary:      Effort: Pulmonary effort is normal.      Breath sounds: Normal breath sounds. Abdominal:      General: Bowel sounds are normal. There is no distension. Palpations: Abdomen is soft. Tenderness: There is no abdominal tenderness. There is no guarding. Musculoskeletal:         General: Swelling and tenderness present. No deformity or signs of injury. Normal range of motion. Cervical back: Normal range of motion. Comments: Swelling, tenderness and erythema noted to the right fifth digit; the fifth digit is warm to the touch; unable to assess capillary refill as patient's nails are painted; pedal pulses are felt and strong bilaterally; patient does have full range of motion of all of her digits   Skin:     General: Skin is warm and dry. Capillary Refill: Capillary refill takes less than 2 seconds. Findings: Erythema present. No bruising. Neurological:      Mental Status: She is alert and oriented to person, place, and time. Psychiatric:         Mood and Affect: Mood normal.         Behavior: Behavior normal.         Thought Content: Thought content normal.         Judgment: Judgment normal.         DIFFERENTIAL DIAGNOSIS / MDM     Upon exam, patient is resting in her room. She appears nontoxic no distress is noted. Heart sounds are normal limits upon auscultation. Lung sounds are clear and equal bilaterally. Bowel sounds are present in all 4 quadrants auscultation. No abdominal distention tenderness or guarding is noted. Upper extremity strength equal bilaterally. Lower extremity strength equal bilaterally. Patient's gait is steady. No facial asymmetry is noted. Patient is currently afebrile nontachycardic. Upon examination of the lower extremities, patient is wearing sandals, she states these are not new and denies any new shoes. There is some redness, swelling and tenderness noted to the right fifth digit. No nail involvement. The right fifth digit is warm to the touch. No puncture sites, lacerations or abrasions noted; no signs of any insect stings or bites. Patient denies stepping on anything or being bit or stung by any insect that she knows of. She states that she has not noticed any ticks in her yard around her home. No bull's-eye appearance to the toe or surrounding skin. Unable to assess capillary refill as patient's nails are painted. Pedal pulses are felt and strong bilaterally. Patient does have full range of motion of all of her digits. Concerns for possible cellulitis. There is no deformity noted. There is no bruising noted. Patient denies any known injury. She denies a history of diabetes. I will obtain x-ray of the right foot. She agrees this plan of care. Denies the need for any pain medication at this time. X-ray showing no acute bony abnormality involving the right foot per radiologist read. I will treat this as a cellulitis and place the patient on antibiotic for 7 days. She is encouraged to follow-up with her primary care physician within the next couple of days. Please return to the emergency department with any fever, chills, body aches, nausea, vomiting, increase in swelling/redness, fatigue, headache, dizziness, lightheadedness, chest pain, shortness of breath or any other new concerning or worsening symptoms. Patient states understanding of education is stable for outpatient follow-up.     PLAN (LABS / IMAGING / EKG):  Orders Placed This Encounter   Procedures    XR FOOT RIGHT (MIN 3 VIEWS)       MEDICATIONS ORDERED:  Orders Placed This Encounter   Medications cephALEXin (KEFLEX) 500 MG capsule     Sig: Take 1 capsule by mouth in the morning and 1 capsule at noon and 1 capsule in the evening and 1 capsule before bedtime. Do all this for 7 days. Dispense:  28 capsule     Refill:  0         Controlled Substances Monitoring:     DIAGNOSTIC RESULTS     EKG: All EKG's are interpreted by the Emergency Department Physician who either signs or Co-signs this chart in the absenceof a cardiologist.      RADIOLOGY: All images are read by the radiologist and their interpretations are reviewed. XR FOOT RIGHT (MIN 3 VIEWS)   Final Result   1. No acute abnormality involving the right foot. No results found. LABS:  No results found for this visit on 08/10/22. EMERGENCY DEPARTMENT COURSE           Vitals:    Vitals:    08/10/22 1212   BP: 139/60   Pulse: 82   Resp: 16   Temp: 98.6 °F (37 °C)   SpO2: 99%   Weight: 66.2 kg (146 lb)   Height: 5' 2\" (1.575 m)     -------------------------  BP: 139/60, Temp: 98.6 °F (37 °C), Heart Rate: 82, Resp: 16      RE-EVALUATION:  See ED Course notes above. CONSULTS:  None    PROCEDURES:  None    FINAL IMPRESSION      1. Cellulitis of toe of right foot          DISPOSITION / PLAN     CONDITION ON DISPOSITION:   Good / Stable for discharge. PATIENT REFERRED TO:  Randolph Stevens[address] [address] [phone]    Call in 1 day      [facility]  [address] [place] [address],[facility] [address]  [phone]  Go to   If symptoms worsen    DISCHARGE MEDICATIONS:  New Prescriptions    CEPHALEXIN (KEFLEX) 500 MG CAPSULE    Take 1 capsule by mouth in the morning and 1 capsule at noon and 1 capsule in the evening and 1 capsule before bedtime. Do all this for 7 days.        MARLENY Thorpe - NP   Emergency Medicine Nurse Practitioner    (Please note that portions of this note were completed with a voice recognition program.  Efforts were made to edit the dictations but occasionally words aremis-transcribed.)      Kermit Hanley, APRN - NP  08/10/22 [facility] [address]-75, APRN - NP  08/10/22 8552

## 2023-04-20 ENCOUNTER — HOSPITAL ENCOUNTER (EMERGENCY)
Facility: CLINIC | Age: 83
Discharge: HOME | End: 2023-04-20
Attending: EMERGENCY MEDICINE
Payer: MEDICARE

## 2023-04-20 ENCOUNTER — APPOINTMENT (OUTPATIENT)
Dept: CT IMAGING | Facility: CLINIC | Age: 83
End: 2023-04-20
Payer: MEDICARE

## 2023-04-20 VITALS
SYSTOLIC BLOOD PRESSURE: 153 MMHG | TEMPERATURE: 98.4 F | BODY MASS INDEX: 27.07 KG/M2 | DIASTOLIC BLOOD PRESSURE: 77 MMHG | HEART RATE: 82 BPM | RESPIRATION RATE: 19 BRPM | OXYGEN SATURATION: 99 % | WEIGHT: 148 LBS

## 2023-04-20 DIAGNOSIS — M54.50 ACUTE RIGHT-SIDED LOW BACK PAIN WITHOUT SCIATICA: Primary | ICD-10-CM

## 2023-04-20 DIAGNOSIS — N39.0 URINARY TRACT INFECTION WITHOUT HEMATURIA, SITE UNSPECIFIED: ICD-10-CM

## 2023-04-20 LAB
BACTERIA: ABNORMAL
BILIRUBIN URINE: NEGATIVE
COLOR: YELLOW
EPITHELIAL CELLS UA: ABNORMAL /HPF (ref 0–5)
GLUCOSE UR STRIP.AUTO-MCNC: NEGATIVE MG/DL
KETONES UR STRIP.AUTO-MCNC: NEGATIVE MG/DL
LEUKOCYTE ESTERASE UR QL STRIP.AUTO: ABNORMAL
NITRITE UR QL STRIP.AUTO: NEGATIVE
OTHER OBSERVATIONS UA: ABNORMAL
PROT UR STRIP.AUTO-MCNC: 5 MG/DL (ref 5–8)
PROT UR STRIP.AUTO-MCNC: NEGATIVE MG/DL
RBC CLUMPS #/AREA URNS AUTO: ABNORMAL /HPF (ref 0–2)
SPECIFIC GRAVITY UA: 1.02 (ref 1–1.03)
TURBIDITY: CLEAR
URINE HGB: NEGATIVE
UROBILINOGEN, URINE: NORMAL
WBC UA: ABNORMAL /HPF (ref 0–5)

## 2023-04-20 PROCEDURE — 96372 THER/PROPH/DIAG INJ SC/IM: CPT

## 2023-04-20 PROCEDURE — 6360000002 HC RX W HCPCS: Performed by: EMERGENCY MEDICINE

## 2023-04-20 PROCEDURE — 74176 CT ABD & PELVIS W/O CONTRAST: CPT

## 2023-04-20 PROCEDURE — 81001 URINALYSIS AUTO W/SCOPE: CPT

## 2023-04-20 PROCEDURE — 99284 EMERGENCY DEPT VISIT MOD MDM: CPT

## 2023-04-20 RX ORDER — KETOROLAC TROMETHAMINE 30 MG/ML
30 INJECTION, SOLUTION INTRAMUSCULAR; INTRAVENOUS ONCE
Status: COMPLETED | OUTPATIENT
Start: 2023-04-20 | End: 2023-04-20

## 2023-04-20 RX ORDER — NITROFURANTOIN 25; 75 MG/1; MG/1
100 CAPSULE ORAL 2 TIMES DAILY
Qty: 14 CAPSULE | Refills: 0 | Status: SHIPPED | OUTPATIENT
Start: 2023-04-20 | End: 2023-04-27

## 2023-04-20 RX ADMIN — KETOROLAC TROMETHAMINE 30 MG: 30 INJECTION, SOLUTION INTRAMUSCULAR; INTRAVENOUS at 18:45

## 2023-04-20 ASSESSMENT — ENCOUNTER SYMPTOMS
BACK PAIN: 1
SORE THROAT: 0
SHORTNESS OF BREATH: 0
DIARRHEA: 0
VOMITING: 0

## 2023-04-20 ASSESSMENT — PAIN - FUNCTIONAL ASSESSMENT: PAIN_FUNCTIONAL_ASSESSMENT: 0-10

## 2023-04-20 ASSESSMENT — PAIN SCALES - GENERAL
PAINLEVEL_OUTOF10: 8
PAINLEVEL_OUTOF10: 8

## 2023-04-20 NOTE — ED PROVIDER NOTES
HISTORY       Past Surgical History:   Procedure Laterality Date    HYSTERECTOMY (CERVIX STATUS UNKNOWN)           CURRENT MEDICATIONS     Previous Medications    IBUPROFEN (ADVIL;MOTRIN) 600 MG TABLET    Take 1 tablet by mouth every 6 hours as needed for Pain    RANITIDINE (ZANTAC) 150 MG TABLET    Take by mouth 2 times daily    SIMVASTATIN (ZOCOR) 10 MG TABLET    Take by mouth nightly    VALSARTAN (DIOVAN) 160 MG TABLET    Take by mouth daily       ALLERGIES     Percocet [oxycodone-acetaminophen]    FAMILY HISTORY     No family history on file. SOCIAL HISTORY       Social History     Socioeconomic History    Marital status:    Tobacco Use    Smoking status: Former    Smokeless tobacco: Never   Vaping Use    Vaping Use: Never used   Substance and Sexual Activity    Alcohol use: Yes     Comment: social    Drug use: No    Sexual activity: Not Currently       SCREENINGS    Alfa Coma Scale  Eye Opening: Spontaneous  Best Verbal Response: Oriented  Best Motor Response: Obeys commands  [place] Coma Scale Score: 15        PHYSICAL EXAM    (up to 7 for level 4, 8 or more for level 5)     ED Triage Vitals [04/20/23 1711]   BP Temp Temp Source Heart Rate Resp SpO2 Height Weight   (!) 153/77 98.4 °F (36.9 °C) Oral 82 19 99 % -- 148 lb (67.1 kg)       Physical Exam  Vitals and nursing note reviewed. Constitutional:       General: She is not in acute distress. Appearance: Normal appearance. She is not ill-appearing or toxic-appearing. HENT:      Head: Normocephalic and atraumatic. Nose: Nose normal. No congestion. Mouth/Throat:      Mouth: Mucous membranes are moist.   Eyes:      General:         Right eye: No discharge. Left eye: No discharge. Conjunctiva/sclera: Conjunctivae normal.   Cardiovascular:      Rate and Rhythm: Normal rate and regular rhythm. Pulses: Normal pulses. Heart sounds: Normal heart sounds. No murmur heard.   Pulmonary:      Effort: Pulmonary effort

## 2024-05-12 ENCOUNTER — HOSPITAL ENCOUNTER (EMERGENCY)
Facility: CLINIC | Age: 84
Discharge: HOME | End: 2024-05-12
Attending: EMERGENCY MEDICINE
Payer: MEDICARE

## 2024-05-12 ENCOUNTER — APPOINTMENT (OUTPATIENT)
Dept: GENERAL RADIOLOGY | Facility: CLINIC | Age: 84
End: 2024-05-12
Payer: MEDICARE

## 2024-05-12 VITALS
DIASTOLIC BLOOD PRESSURE: 90 MMHG | RESPIRATION RATE: 16 BRPM | TEMPERATURE: 99.1 F | SYSTOLIC BLOOD PRESSURE: 174 MMHG | HEART RATE: 75 BPM | WEIGHT: 148 LBS | HEIGHT: 62 IN | OXYGEN SATURATION: 97 % | BODY MASS INDEX: 27.23 KG/M2

## 2024-05-12 DIAGNOSIS — M17.12 OSTEOARTHRITIS OF LEFT KNEE, UNSPECIFIED OSTEOARTHRITIS TYPE: Primary | ICD-10-CM

## 2024-05-12 PROCEDURE — 99283 EMERGENCY DEPT VISIT LOW MDM: CPT

## 2024-05-12 PROCEDURE — 73562 X-RAY EXAM OF KNEE 3: CPT

## 2024-05-12 RX ORDER — MELOXICAM 15 MG/1
15 TABLET ORAL DAILY
Qty: 14 TABLET | Refills: 0 | Status: SHIPPED | OUTPATIENT
Start: 2024-05-12

## 2024-05-12 ASSESSMENT — PAIN DESCRIPTION - ONSET: ONSET: ON-GOING

## 2024-05-12 ASSESSMENT — PAIN DESCRIPTION - ORIENTATION: ORIENTATION: LEFT

## 2024-05-12 ASSESSMENT — PAIN DESCRIPTION - PAIN TYPE: TYPE: ACUTE PAIN

## 2024-05-12 ASSESSMENT — PAIN DESCRIPTION - DESCRIPTORS: DESCRIPTORS: SHARP

## 2024-05-12 ASSESSMENT — PAIN DESCRIPTION - FREQUENCY: FREQUENCY: INTERMITTENT

## 2024-05-12 ASSESSMENT — PAIN SCALES - GENERAL: PAINLEVEL_OUTOF10: 8

## 2024-05-12 ASSESSMENT — PAIN DESCRIPTION - LOCATION: LOCATION: KNEE

## 2024-05-12 NOTE — DISCHARGE INSTR - COC
Continuity of Care Form    Patient Name: Ruma Carreon   :  1940  MRN:  [id number]    Admit date:  2024  Discharge date:  ***    Code Status Order: No Order   Advance Directives:     Admitting Physician:  No admitting provider for patient encounter.  PCP: Lino Swan MD    Discharging Nurse: ***  Discharging Hospital Unit/Room#: SRI/SRI  Discharging Unit Phone Number: ***    Emergency Contact:   Extended Emergency Contact Information  Primary Emergency Contact: Nayely Meyer  Home Phone: [phone]  Relation: Child    Past Surgical History:  Past Surgical History:   Procedure Laterality Date    HYSTERECTOMY (CERVIX STATUS UNKNOWN)         Immunization History:   Immunization History   Administered Date(s) Administered    COVID-19, MODERNA Bivalent, (age 12y+), IM, 50 mcg/0.5 mL 10/15/2022    COVID-19, MODERNA, (- formula), (age 12y+), IM, 50mcg/0.5mL 2024    TDaP, ADACEL (age 10y-64y), BOOSTRIX (age 10y+), IM, 0.5mL 2021       Active Problems:  There is no problem list on file for this patient.      Isolation/Infection:   Isolation            No Isolation          Patient Infection Status       None to display            Nurse Assessment:  Last Vital Signs: BP (!) 174/90   Pulse 75   Temp 99.1 °F (37.3 °C) (Oral)   Resp 16   Ht 1.575 m (5' 2\")   Wt 67.1 kg (148 lb)   SpO2 97%   BMI 27.07 kg/m²     Last documented pain score (0-10 scale): Pain Level: 8  Last Weight:   Wt Readings from Last 1 Encounters:   24 67.1 kg (148 lb)     Mental Status:  {IP PT MENTAL STATUS:}    IV Access:  {[facility] IV ACCESS:171566804}    Nursing Mobility/ADLs:  Walking   {CHP DME ADLs:235047942}  Transfer  {CHP DME ADLs:771694330}  Bathing  {CHP DME ADLs:655257127}  Dressing  {CHP DME ADLs:362453767}  Toileting  {CHP DME ADLs:021540651}  Feeding  {CHP DME ADLs:436420586}  Med Admin  {[facility] ADLs:872066217}  Med Delivery   {[facility] MED Delivery:647420491}    Wound Care Documentation

## 2024-05-12 NOTE — ED PROVIDER NOTES
[facility]  EMERGENCY DEPARTMENT ENCOUNTER      Pt Name: Ruma Carreon  MRN: [id number]  Birthdate 1940  Date of evaluation: 5/12/2024  Provider: MARLENY Arcos CNP    CHIEF COMPLAINT       Chief Complaint   Patient presents with    Knee Pain     Left         HISTORY OF PRESENT ILLNESS   (Location/Symptom, Timing/Onset, Context/Setting, Quality, Duration, Modifying Factors, Severity)  Note limiting factors.   Ruma Carreon is a 83 y.o. female who presents to the emergency department for evaluation of left knee pain.  Patient states she was working out in the yard the past couple of days trimming bushes when the left knee pain began.  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  She states now both of her knees are causing her pain.  She states she used to follow with Dr. Arenas orthopedics, but has not seen him in over 9 years.  She states she tried taking some acetaminophen yesterday to treat her discomfort without much relief.  She denies any excessive swelling rashes or sores to left knee.  She states she is able to ambulate, but it does cause her discomfort worse in the left knee than the right.        Nursing Notes were reviewed.    REVIEW OF SYSTEMS       Constitutional: No fevers or chills   HEENT: No sore throat, rhinorrhea, or earache   Eyes: No blurry vision or double vision no drainage   Cardiovascular: No chest pain or tachycardia   Respiratory: No wheezing or shortness of breath no cough   Gastrointestinal: No nausea, vomiting, diarrhea, constipation, or abdominal pain   : No hematuria or dysuria   Musculoskeletal: No swelling or pain + bilateral  knee pain worse on left than right     Skin: No rash   Neurological: No focal neurologic complaints, paresthesias, weakness, or headache      Except as noted above the remainder of the review of systems was reviewed and negative.       PAST MEDICAL 
function distally.  X-ray shows DJD.  She declines a brace.  She will be following up with her orthopedist.  She is discharged in good condition.     Amanda Warner MD  05/13/24 2031

## 2024-05-12 NOTE — DISCHARGE INSTRUCTIONS
Follow-up with orthopedic doctor for reevaluation and continued care.  Call first thing in the morning to schedule an appointment this week     Take Mobic daily as directed for discomfort    You may take acetaminophen as directed as needed for discomfort as well if needed in addition to the Mobic    Ice to left knee 3 times a day    Keep left leg elevated at all times when sitting    If any your symptoms worsen or any new or concerning symptoms arise please return to the emergency department immediately

## 2024-05-14 ENCOUNTER — OFFICE VISIT (OUTPATIENT)
Dept: ORTHOPEDIC SURGERY | Age: 84
End: 2024-05-14
Payer: MEDICARE

## 2024-05-14 VITALS — OXYGEN SATURATION: 99 % | WEIGHT: 153 LBS | HEIGHT: 60 IN | BODY MASS INDEX: 30.04 KG/M2 | RESPIRATION RATE: 17 BRPM

## 2024-05-14 DIAGNOSIS — M17.12 ARTHRITIS OF LEFT KNEE: ICD-10-CM

## 2024-05-14 DIAGNOSIS — M25.562 ACUTE PAIN OF LEFT KNEE: Primary | ICD-10-CM

## 2024-05-14 DIAGNOSIS — M25.562 LEFT KNEE PAIN, UNSPECIFIED CHRONICITY: Primary | ICD-10-CM

## 2024-05-14 PROCEDURE — 99203 OFFICE O/P NEW LOW 30 MIN: CPT

## 2024-05-14 PROCEDURE — 1036F TOBACCO NON-USER: CPT

## 2024-05-14 PROCEDURE — 1123F ACP DISCUSS/DSCN MKR DOCD: CPT

## 2024-05-14 PROCEDURE — G8419 CALC BMI OUT NRM PARAM NOF/U: HCPCS

## 2024-05-14 PROCEDURE — G8400 PT W/DXA NO RESULTS DOC: HCPCS

## 2024-05-14 PROCEDURE — 1090F PRES/ABSN URINE INCON ASSESS: CPT

## 2024-05-14 PROCEDURE — G8427 DOCREV CUR MEDS BY ELIG CLIN: HCPCS

## 2024-05-14 PROCEDURE — 20610 DRAIN/INJ JOINT/BURSA W/O US: CPT

## 2024-05-14 RX ORDER — METHYLPREDNISOLONE ACETATE 80 MG/ML
80 INJECTION, SUSPENSION INTRA-ARTICULAR; INTRALESIONAL; INTRAMUSCULAR; SOFT TISSUE ONCE
Status: COMPLETED | OUTPATIENT
Start: 2024-05-14 | End: 2024-05-15

## 2024-05-14 RX ORDER — BUPIVACAINE HYDROCHLORIDE 2.5 MG/ML
2 INJECTION, SOLUTION INFILTRATION; PERINEURAL ONCE
Status: COMPLETED | OUTPATIENT
Start: 2024-05-14 | End: 2024-05-15

## 2024-05-14 ASSESSMENT — ENCOUNTER SYMPTOMS
VOMITING: 0
NAUSEA: 0
COLOR CHANGE: 0

## 2024-05-14 NOTE — PROGRESS NOTES
[facility] PHYSICIANS [facility] ORTHOPEDICS AND SPORTS MEDICINE  [address]  [place] [address]  Dept: [phone]    Ambulatory Orthopedic New Patient Visit      CHIEF COMPLAINT:    Chief Complaint   Patient presents with    Knee Pain     Left knee pain       HISTORY OF PRESENT ILLNESS:      Date of Injury: no known injuries.     The patient is a 83 y.o. female who is being seen  for consultation and evaluation of her bilateral knee pain.  She has had the right knee pain for many years now, but states that she only recently started to develop the left knee pain over the past several days.  She was evaluated for the left knee pain in the emergency department 2 days ago on 5/12/2024 and at that time x-rays of the left knee were done.  The x-rays demonstrated mild degenerative changes throughout the left knee. She was prescribed a two week supply of Meloxicam 15mg and referred to our office.  She states that she took the meloxicam yesterday and that she did notice a good improvement in her pain.  She did not take any meloxicam today.  She locates the pain as being along the anterior and medial aspect of the knee and she also states that the pain occasionally radiates up her thigh and down her shin.  She denies any swelling, redness, or warmth of the knee.  She also denies any mechanical catching or locking of the knee.  She states that the pain is worse with walking and going down steps.  She also states that the pain is bothersome at night when she is trying to sleep.      Past Medical History:    Past Medical History:   Diagnosis Date    Hyperlipidemia     Hypertension        Past Surgical History:    Past Surgical History:   Procedure Laterality Date    HYSTERECTOMY (CERVIX STATUS UNKNOWN)         Current Medications:   Current Outpatient Medications   Medication Sig Dispense Refill    meloxicam (MOBIC) 15 MG tablet Take 1 tablet by mouth daily 14

## 2024-05-15 RX ADMIN — METHYLPREDNISOLONE ACETATE 80 MG: 80 INJECTION, SUSPENSION INTRA-ARTICULAR; INTRALESIONAL; INTRAMUSCULAR; SOFT TISSUE at 08:21

## 2024-05-15 RX ADMIN — BUPIVACAINE HYDROCHLORIDE 5 MG: 2.5 INJECTION, SOLUTION INFILTRATION; PERINEURAL at 08:20

## 2024-05-20 ENCOUNTER — TELEPHONE (OUTPATIENT)
Dept: ORTHOPEDIC SURGERY | Age: 84
End: 2024-05-20

## 2024-05-20 ENCOUNTER — OFFICE VISIT (OUTPATIENT)
Dept: ORTHOPEDIC SURGERY | Age: 84
End: 2024-05-20
Payer: MEDICARE

## 2024-05-20 VITALS — HEIGHT: 60 IN | RESPIRATION RATE: 16 BRPM | OXYGEN SATURATION: 98 % | WEIGHT: 152.4 LBS | BODY MASS INDEX: 29.92 KG/M2

## 2024-05-20 DIAGNOSIS — R21 RASH: ICD-10-CM

## 2024-05-20 DIAGNOSIS — M25.562 LEFT KNEE PAIN, UNSPECIFIED CHRONICITY: Primary | ICD-10-CM

## 2024-05-20 DIAGNOSIS — M17.12 ARTHRITIS OF LEFT KNEE: ICD-10-CM

## 2024-05-20 PROCEDURE — G8400 PT W/DXA NO RESULTS DOC: HCPCS

## 2024-05-20 PROCEDURE — 1123F ACP DISCUSS/DSCN MKR DOCD: CPT

## 2024-05-20 PROCEDURE — 1036F TOBACCO NON-USER: CPT

## 2024-05-20 PROCEDURE — G8427 DOCREV CUR MEDS BY ELIG CLIN: HCPCS

## 2024-05-20 PROCEDURE — G8419 CALC BMI OUT NRM PARAM NOF/U: HCPCS

## 2024-05-20 PROCEDURE — 1090F PRES/ABSN URINE INCON ASSESS: CPT

## 2024-05-20 PROCEDURE — 99212 OFFICE O/P EST SF 10 MIN: CPT

## 2024-05-20 RX ORDER — VALSARTAN AND HYDROCHLOROTHIAZIDE 160; 12.5 MG/1; MG/1
1 TABLET, FILM COATED ORAL DAILY
COMMUNITY

## 2024-05-20 RX ORDER — OMEPRAZOLE 20 MG/1
CAPSULE, DELAYED RELEASE ORAL
COMMUNITY
Start: 2024-05-07

## 2024-05-20 RX ORDER — FLUOCINONIDE TOPICAL SOLUTION USP, 0.05% 0.5 MG/ML
SOLUTION TOPICAL
COMMUNITY
Start: 2024-03-14

## 2024-05-20 ASSESSMENT — ENCOUNTER SYMPTOMS
COLOR CHANGE: 0
NAUSEA: 0
VOMITING: 0

## 2024-05-20 NOTE — PROGRESS NOTES
[facility] ORTHOPEDICS AND SPORTS MEDICINE  [address]  [place] [facility] [address]  Dept: [phone]  Dept Fax: [phone]        Ambulatory Follow Up      Subjective:   Ruma Carreon is a 83 y.o. year old female who presents to our office today for routine followup regarding her   1. Left knee pain, unspecified chronicity    2. Arthritis of left knee    .    Chief Complaint   Patient presents with    Knee Pain     Left Knee-li 5/14/24       Date of Injury: no known injuries.     HPI Ruma Carreon  is a 83 y.o. female who presents today for follow-up of her left knee pain.  She was last seen in the office 1 week ago on 5/14/2024.  At that time she was given a corticosteroid injection.  She states that she has noticed minimal relief with the corticosteroid injection.  She continues to have most of her pain at night when she is trying to sleep.  She is here today because she noticed a rash along the lower left leg.  She states that she did not notice this rash until after the injection.  She denies any itching, drainage, or swelling along the rash.  She also denies any redness, swelling, or warmth of the knee.  She additionally denies any fevers or chills.  She does state that she was diagnosed with a staph infection a couple of months ago and was prescribed topical mupirocin by her PCP.  She started applying this yesterday to the new rash.    Review of Systems   Constitutional:  Negative for chills and fever.   Gastrointestinal:  Negative for nausea and vomiting.   Musculoskeletal:  Positive for arthralgias. Negative for joint swelling.   Skin:  Positive for rash. Negative for color change.   Neurological:  Negative for weakness and numbness.         Objective :   Resp 16   Ht 1.524 m (5')   Wt 69.1 kg (152 lb 6.4 oz)   SpO2 98%   BMI 29.76 kg/m²  Body mass index is 29.76 kg/m².  General: Ruma Carreon is a 83 y.o. female who is

## 2024-05-20 NOTE — TELEPHONE ENCOUNTER
Pt calling in stating her is having left leg pain and would like to be sen today by kassidy if possible

## 2024-06-17 ENCOUNTER — OFFICE VISIT (OUTPATIENT)
Dept: ORTHOPEDIC SURGERY | Age: 84
End: 2024-06-17
Payer: MEDICARE

## 2024-06-17 VITALS — BODY MASS INDEX: 29.25 KG/M2 | HEIGHT: 60 IN | RESPIRATION RATE: 14 BRPM | WEIGHT: 149 LBS

## 2024-06-17 DIAGNOSIS — M17.12 ARTHRITIS OF LEFT KNEE: ICD-10-CM

## 2024-06-17 DIAGNOSIS — R21 RASH: Primary | ICD-10-CM

## 2024-06-17 PROCEDURE — G8419 CALC BMI OUT NRM PARAM NOF/U: HCPCS

## 2024-06-17 PROCEDURE — G8400 PT W/DXA NO RESULTS DOC: HCPCS

## 2024-06-17 PROCEDURE — G8427 DOCREV CUR MEDS BY ELIG CLIN: HCPCS

## 2024-06-17 PROCEDURE — 1036F TOBACCO NON-USER: CPT

## 2024-06-17 PROCEDURE — 1090F PRES/ABSN URINE INCON ASSESS: CPT

## 2024-06-17 PROCEDURE — 99213 OFFICE O/P EST LOW 20 MIN: CPT

## 2024-06-17 PROCEDURE — 1123F ACP DISCUSS/DSCN MKR DOCD: CPT

## 2024-06-17 ASSESSMENT — ENCOUNTER SYMPTOMS
NAUSEA: 0
COLOR CHANGE: 0
VOMITING: 0

## 2024-06-17 NOTE — PROGRESS NOTES
a walker.  Examination of the knee demonstrates that the skin is intact and there is no erythema, ecchymosis, or soft tissue swelling.  There is a healing shingles rash inferior and medial to the knee.  There is no pain to palpation along medial or lateral joint lines.  Range of motion of the knee includes 0-120.  The knee is stable to both varus and valgus stress with 0 and 30 degrees of flexion.  There is mildly increased pain with valgus Soren's.  Anterior drawer testing and Lachman's is negative.  The calf is soft and nontender.  Motor, sensory, and vascular function is grossly intact.  Neuro: alert and oriented to person and place.   Eyes: Extra-ocular muscles intact  Mouth: Oral mucosa moist. No perioral lesions  Pulm: Respirations unlabored and regular. Symmetric chest excursion without outward deformity is noted.   Skin: warm, well perfused  Psych:   Patient has good fund of knowledge and displays understanging of exam, diagnosis, and plan.    Radiology:     XR knee left (min 4 views) 5/15/24  Narrative & Impression  History:   Left knee pain     Findings:   Standing AP/Lateral/Tunnel/Merchant view xrays of the Left done in the office today shows moderate to severe medial joint space narrowing, tricompartmental osteophytosis, joint line sclerosis medially.    No evidence of fracture, subluxation, dislocation, radioopaque foreign body/tumor is noted.  Lateral subluxation of the tibia is appreciated.     Impression:   Left knee moderate to severe degenerative changes as described above.      Assessment:      1. Rash    2. Arthritis of left knee       Plan:   Assessment & Plan     The patient presents today for follow-up of her left knee pain.  She last had a left knee corticosteroid injection on 5/14/2024.  The patient actually then noticed a rash along the left lower leg and was diagnosed with shingles.  She has recently finished the antiviral medication.  She is unsure how much of her left knee pain was

## 2024-10-31 ENCOUNTER — OFFICE VISIT (OUTPATIENT)
Age: 84
End: 2024-10-31

## 2024-10-31 VITALS
BODY MASS INDEX: 29.25 KG/M2 | RESPIRATION RATE: 15 BRPM | SYSTOLIC BLOOD PRESSURE: 116 MMHG | HEART RATE: 70 BPM | HEIGHT: 60 IN | DIASTOLIC BLOOD PRESSURE: 65 MMHG | WEIGHT: 149 LBS

## 2024-10-31 DIAGNOSIS — D32.9 MENINGIOMA (HCC): Primary | ICD-10-CM

## 2024-10-31 RX ORDER — CALCIUM CARBONATE 500(1250)
500 TABLET ORAL DAILY
COMMUNITY

## 2024-11-02 NOTE — PROGRESS NOTES
Review of Systems   HENT:  Positive for hearing loss.    Eyes:  Positive for visual disturbance.   Genitourinary:  Positive for urgency.   Musculoskeletal:  Positive for joint swelling.   Neurological:  Positive for dizziness and weakness.   All other systems reviewed and are negative.    
tablet Take by mouth daily (Patient not taking: Reported on 10/31/2024)       No current facility-administered medications on file prior to visit.     Social History     Tobacco Use    Smoking status: Former    Smokeless tobacco: Never   Vaping Use    Vaping status: Never Used   Substance Use Topics    Alcohol use: Not Currently     Comment: social    Drug use: No       Allergies   Allergen Reactions    Percocet [Oxycodone-Acetaminophen] Nausea Only       Review of Systems:     I reviewed the review of systems as documented by clinical staff.      Physical Exam:      /65 (Site: Left Upper Arm, Position: Sitting, Cuff Size: Large Adult)   Pulse 70   Resp 15   Ht 1.524 m (5')   Wt 67.6 kg (149 lb)   BMI 29.10 kg/m²     She is awake, alert, and in no acute distress.  She answers questions appropriately with clear and fluent speech.  Her extraocular movements are intact.  The face moves symmetrically.  Her cranial nerves are otherwise grossly intact.  She does not have pronator drift.  She is moving her extremities with good strength.  She does well with finger-to-nose testing.  Her gait is steady.      Studies Review:     I personally reviewed an MRI of the brain from October 4, 2024, as well as 1 from January 2021.  Both of these studies were done at [facility].  I was able to review the radiology report for the more recent study.  There is a very small meningioma overlying the right frontal lobe that does not seem to have changed.  There is also a probable meningioma in the posterior fossa that seems to be based along the tentorium near the transverse sigmoid junction.  This lesion has increased in size over the past few years.  It is about 1 cm bigger.  There is also an arachnoid cyst along the lateral aspect of the right cerebellar hemisphere.  The lesions are both almost certainly benign meningiomas.    Assessment and Plan:     1. Meningioma (HCC)        Plan: Ms. Carreon has 2 meningiomas.  There is